# Patient Record
(demographics unavailable — no encounter records)

---
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
## 2019-02-17 NOTE — XR
EXAMINATION TYPE: XR KUB

 

DATE OF EXAM: 2/17/2019

 

CLINICAL DATA:  36-year-old female with right lower quadrant pain after fall, [facility]

 

COMPARISON:  12/21/2012

 

FINDINGS:

 

Lung bases are clear. 

 

No evidence for free intraperitoneal air. 

 

No dilated small bowel or air-fluid levels. Scattered air and stool seen throughout the colon extendi
ng distally into the rectum. Moderate stool burden. 

 

No suspicious calcifications identified.

 

 

 

IMPRESSION:

 

Moderate stool burden. No evidence of bowel obstruction or free intraperitoneal air.

## 2019-02-17 NOTE — ED
Abdominal Pain HPI





- General


Source: patient, RN notes reviewed


Mode of arrival: ambulatory


Limitations: no limitations





<Stephan Mckeon - Last Filed: 19 13:41>





<Bill Cali - Last Filed: 19 13:44>





- General


Chief Complaint: Abdominal Pain


Stated Complaint: abdominal pain


Time Seen by Provider: 19 10:26





- History of Present Illness


Initial Comments: 





This is a 36-year-old female presents emergency Department with chief complaint 

of right-sided abdominal pain.  Patient states started yesterday and states 

that has worsened.  Patient states initially started in her back but now has 

located in the right lower abdomen.  Patient has no history kidney stones.  

Patient denies diarrhea constipation.  She does admit to nausea and vomiting.  

Patient states she has not been able thickening for her pain.  Patient denies 

fever, chills, chest pain or shortness breath.  Patient states nothing makes 

the pain feel better or worse.  Patient had prior  section no other 

abdominal surgeries. (Stephan Mckeon)





- Related Data


 Home Medications











 Medication  Instructions  Recorded  Confirmed


 


No Known Home Medications  19











 Allergies











Allergy/AdvReac Type Severity Reaction Status Date / Time


 


ibuprofen Allergy  Anaphylaxis Verified 19 11:00














Review of Systems


ROS Other: All systems not noted in ROS Statement are negative.





<Stephan Mckeon - Last Filed: 19 13:41>


ROS Other: All systems not noted in ROS Statement are negative.





<Bill Cali - Last Filed: 19 13:44>


ROS Statement: 


Those systems with pertinent positive or pertinent negative responses have been 

documented in the HPI.








Past Medical History


Past Medical History: No Reported History


History of Any Multi-Drug Resistant Organisms: None Reported


Past Surgical History:  Section, Orthopedic Surgery


Past Psychological History: No Psychological Hx Reported


Smoking Status: Current every day smoker


Past Alcohol Use History: None Reported, Occasional


Past Drug Use History: None Reported





<Stephan Mckeon - Last Filed: 19 13:41>





General Exam


Limitations: no limitations


General appearance: alert, in no apparent distress


Head exam: Present: atraumatic, normocephalic, normal inspection


Neck exam: Present: normal inspection.  Absent: tenderness, meningismus, 

lymphadenopathy


Respiratory exam: Present: normal lung sounds bilaterally.  Absent: respiratory 

distress, wheezes, rales, rhonchi, stridor


Cardiovascular Exam: Present: normal rhythm, tachycardia, normal heart sounds.  

Absent: systolic murmur, diastolic murmur, rubs, gallop, clicks


GI/Abdominal exam: Present: soft, tenderness (Mild right lower quadrant), 

normal bowel sounds.  Absent: distended, guarding, rebound, rigid


Back exam: Present: CVA tenderness (R).  Absent: CVA tenderness (L)


Skin exam: Present: warm, dry, intact, normal color.  Absent: rash





<Stephan Mckeon - Last Filed: 19 13:41>





 Vital Signs











  19





  10:22 13:24


 


Temperature 98.7 F 


 


Pulse Rate 119 H 86


 


Respiratory 18 16





Rate  


 


Blood Pressure 107/63 139/78


 


O2 Sat by Pulse 100 99





Oximetry  














Medical Decision Making





- Lab Data


Result diagrams: 


 19 11:26





 19 11:26





<Stephan Mckeon - Last Filed: 19 13:41>





- Lab Data


Result diagrams: 


 19 11:26





 19 11:26





<Bill Cali - Last Filed: 19 13:44>





- Medical Decision Making





36-year-old female presented emergency from for right-sided abdominal pain.  

Patient's found extensive pyelonephritis on CT.  Patient has 24,000 white count 

and evidence of urinary tract infection.  Patient was started on 2 g Rocephin 

will be admitted for pain control and IV antibiotics. (Stephan Mckeon)





Case reviewed with practitioner hitesh.  Chart and results reviewed.  Case was 

discussed with Dr. Holloway, who will admit covering for hospital call. (Bill Cali)





- Lab Data





 Lab Results











  19 Range/Units





  11:26 11:26 13:00 


 


WBC   24.1 H   (3.8-10.6)  k/uL


 


RBC   4.15   (3.80-5.40)  m/uL


 


Hgb   12.7   (11.4-16.0)  gm/dL


 


Hct   37.1   (34.0-46.0)  %


 


MCV   89.3   (80.0-100.0)  fL


 


MCH   30.7   (25.0-35.0)  pg


 


MCHC   34.3   (31.0-37.0)  g/dL


 


RDW   14.0   (11.5-15.5)  %


 


Plt Count   329   (150-450)  k/uL


 


Neutrophils %   85   %


 


Lymphocytes %   6   %


 


Monocytes %   6   %


 


Eosinophils %   1   %


 


Basophils %   0   %


 


Neutrophils #   20.4 H   (1.3-7.7)  k/uL


 


Lymphocytes #   1.5   (1.0-4.8)  k/uL


 


Monocytes #   1.5 H   (0-1.0)  k/uL


 


Eosinophils #   0.3   (0-0.7)  k/uL


 


Basophils #   0.0   (0-0.2)  k/uL


 


Sodium  134 L    (137-145)  mmol/L


 


Potassium  4.0    (3.5-5.1)  mmol/L


 


Chloride  102    ()  mmol/L


 


Carbon Dioxide  22    (22-30)  mmol/L


 


Anion Gap  10    mmol/L


 


BUN  12    (7-17)  mg/dL


 


Creatinine  0.84    (0.52-1.04)  mg/dL


 


Est GFR (CKD-EPI)AfAm  >90    (>60 ml/min/1.73 sqM)  


 


Est GFR (CKD-EPI)NonAf  90    (>60 ml/min/1.73 sqM)  


 


Glucose  108 H    (74-99)  mg/dL


 


Calcium  9.0    (8.4-10.2)  mg/dL


 


Total Bilirubin  1.4 H    (0.2-1.3)  mg/dL


 


AST  17    (14-36)  U/L


 


ALT  28    (9-52)  U/L


 


Alkaline Phosphatase  67    ()  U/L


 


Total Protein  6.8    (6.3-8.2)  g/dL


 


Albumin  3.4 L    (3.5-5.0)  g/dL


 


Amylase  <30 L    ()  U/L


 


Lipase  13 L    ()  U/L


 


Urine Color    Light Yellow  


 


Urine Appearance    Clear  (Clear)  


 


Urine pH    6.5  (5.0-8.0)  


 


Ur Specific Gravity    1.018  (1.001-1.035)  


 


Urine Protein    Trace H  (Negative)  


 


Urine Glucose (UA)    Negative  (Negative)  


 


Urine Ketones    Negative  (Negative)  


 


Urine Blood    Trace H  (Negative)  


 


Urine Nitrite    Positive H  (Negative)  


 


Urine Bilirubin    Negative  (Negative)  


 


Urine Urobilinogen    <2.0  (<2.0)  mg/dL


 


Ur Leukocyte Esterase    Large H  (Negative)  


 


Urine RBC    1  (0-5)  /hpf


 


Urine WBC    76 H  (0-5)  /hpf


 


Ur Squamous Epith Cells    1  (0-4)  /hpf


 


Urine Bacteria    Rare H  (None)  /hpf














Disposition





<Stephan Mckeon - Last Filed: 19 13:41>





<Bill Cali - Last Filed: 19 13:44>


Clinical Impression: 


 Pyelonephritis





Disposition: ADMITTED AS IP TO THIS HOSP


Condition: Fair


Referrals: 


None,Stated [Primary Care Provider] - 1-2 days

## 2019-02-17 NOTE — CT
EXAMINATION TYPE: CT abdomen pelvis w con

 

DATE OF EXAM: 2/17/2019

 

COMPARISON: Correlation radiograph same date

 

HISTORY: 36-year-old female RLQ pain with nausea and fever

 

TECHNIQUE: Contiguous axial scanning of the abdomen and pelvis following administration of 100 ml Iso
annette 300 IV contrast.  Delayed images through the kidneys and coronal/sagittal reconstructions perform
ed.

 

CT DLP: 623.5 mGycm

Automated exposure control for dose reduction was used.

 

 

FINDINGS:

Heart normal size without pericardial effusion. Lung bases clear without pleural effusion.

 

The liver is mildly enlarged at 18.9 cm. There may be mild underlying fatty infiltration. A couple ti
ny subcentimeter hypodensity left liver lobe too small for accurate CT characterization, likely cysts
.

 

Portal venous system is patent. No biliary ductal dilatation.

 

Gallbladder, adrenal glands, spleen, and pancreas appear within normal limits.

 

Bilateral renal cysts measuring up to 2.6 and 2.5 cm on the left and measuring up to 3.1 cm on the ri
ght. Subcentimeter hypodense cortical lesions in the right kidney are too small for accurate CT kirk
cterization but also likely represent cysts.

 

Extensive patchy and wedge-shaped areas of hypoenhancement and striated nephrograms within the right 
kidney. Moderate surrounding fat stranding is slightly edematous appearance of the right kidney. Ther
e is some urothelial thickening on the right as well. No hydronephrosis. Relatively symmetric excreti
on of contrast from the kidneys.

 

Retroaortic left renal vein.

 

No dilated small bowel, free fluid, or free air. Portion of a normal appendix is seen. Moderate stool
 burden. No pericolonic inflammatory change.

 

Prominent fluid-filled small bowel loops in the lower abdomen and pelvis are nonspecific.

 

Bladder partially distended. Pelvic phleboliths. Uterus and ovaries are visualized. There appears to 
be a possible 4.5 cm cyst or dominant follicle in the right ovary. Difficult to clearly delineate thi
s from adjacent fluid-filled bowel loops. Follow-up ultrasound in 6-8 weeks can be performed.

 

No abnormal fluid collection the pelvis or pelvic lymphadenopathy seen. Tampon is present low in the 
vaginal canal.

 

Bones: No osseous destructive process.

 

 

 

 

IMPRESSION: 

 

1. CT FINDINGS HIGHLY SUGGESTIVE OF extensive right-sided pyelonephritis.

2. Prominent fluid-filled small bowel loops in the lower abdomen and pelvis could represent a ileus.

3. Mild hepatomegaly (18.9 cm) with possible mild fatty infiltration of the liver.

4. Either a prominent fluid-filled small bowel loop in the pelvis or a 4.5 cm right ovarian cyst/kevin
nant follicle. Follow-up pelvic ultrasound in 6-8 weeks to reassess.
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
